# Patient Record
Sex: FEMALE | Race: WHITE | Employment: FULL TIME | URBAN - METROPOLITAN AREA
[De-identification: names, ages, dates, MRNs, and addresses within clinical notes are randomized per-mention and may not be internally consistent; named-entity substitution may affect disease eponyms.]

---

## 2017-01-04 PROBLEM — N81.10 FEMALE CYSTOCELE: Status: ACTIVE | Noted: 2017-01-04

## 2017-01-04 PROCEDURE — 88175 CYTOPATH C/V AUTO FLUID REDO: CPT | Performed by: PHYSICIAN ASSISTANT

## 2017-07-24 PROCEDURE — 87086 URINE CULTURE/COLONY COUNT: CPT | Performed by: EMERGENCY MEDICINE

## 2017-07-24 PROCEDURE — 87077 CULTURE AEROBIC IDENTIFY: CPT | Performed by: EMERGENCY MEDICINE

## 2017-07-24 PROCEDURE — 87186 SC STD MICRODIL/AGAR DIL: CPT | Performed by: EMERGENCY MEDICINE

## 2018-04-24 PROCEDURE — 87086 URINE CULTURE/COLONY COUNT: CPT | Performed by: PHYSICIAN ASSISTANT

## 2019-03-05 PROBLEM — Z99.89 OSA ON CPAP: Status: ACTIVE | Noted: 2019-03-05

## 2019-03-05 PROBLEM — G47.33 OSA ON CPAP: Status: ACTIVE | Noted: 2019-03-05

## 2022-02-23 PROBLEM — N81.4 UTERINE PROLAPSE: Status: ACTIVE | Noted: 2022-02-23

## 2023-10-02 RX ORDER — METHENAMINE HIPPURATE 1000 MG/1
1 TABLET ORAL 2 TIMES DAILY
COMMUNITY

## 2023-10-03 ENCOUNTER — ANESTHESIA EVENT (OUTPATIENT)
Dept: SURGERY | Facility: HOSPITAL | Age: 63
End: 2023-10-03
Payer: COMMERCIAL

## 2023-10-04 ENCOUNTER — HOSPITAL ENCOUNTER (OUTPATIENT)
Facility: HOSPITAL | Age: 63
Setting detail: HOSPITAL OUTPATIENT SURGERY
Discharge: HOME | End: 2023-10-04
Attending: STUDENT IN AN ORGANIZED HEALTH CARE EDUCATION/TRAINING PROGRAM | Admitting: STUDENT IN AN ORGANIZED HEALTH CARE EDUCATION/TRAINING PROGRAM
Payer: COMMERCIAL

## 2023-10-04 ENCOUNTER — ANESTHESIA (OUTPATIENT)
Dept: SURGERY | Facility: HOSPITAL | Age: 63
End: 2023-10-04
Payer: COMMERCIAL

## 2023-10-04 VITALS
TEMPERATURE: 98 F | RESPIRATION RATE: 14 BRPM | OXYGEN SATURATION: 98 % | DIASTOLIC BLOOD PRESSURE: 76 MMHG | WEIGHT: 195 LBS | HEART RATE: 71 BPM | HEIGHT: 68 IN | SYSTOLIC BLOOD PRESSURE: 139 MMHG | BODY MASS INDEX: 29.55 KG/M2

## 2023-10-04 PROCEDURE — 0USG4ZZ REPOSITION VAGINA, PERCUTANEOUS ENDOSCOPIC APPROACH: ICD-10-PCS | Performed by: STUDENT IN AN ORGANIZED HEALTH CARE EDUCATION/TRAINING PROGRAM

## 2023-10-04 PROCEDURE — 88307 TISSUE EXAM BY PATHOLOGIST: CPT | Performed by: STUDENT IN AN ORGANIZED HEALTH CARE EDUCATION/TRAINING PROGRAM

## 2023-10-04 PROCEDURE — 0UT94ZL RESECTION OF UTERUS, SUPRACERVICAL, PERCUTANEOUS ENDOSCOPIC APPROACH: ICD-10-PCS | Performed by: STUDENT IN AN ORGANIZED HEALTH CARE EDUCATION/TRAINING PROGRAM

## 2023-10-04 PROCEDURE — 0UT74ZZ RESECTION OF BILATERAL FALLOPIAN TUBES, PERCUTANEOUS ENDOSCOPIC APPROACH: ICD-10-PCS | Performed by: STUDENT IN AN ORGANIZED HEALTH CARE EDUCATION/TRAINING PROGRAM

## 2023-10-04 PROCEDURE — 8E0W4CZ ROBOTIC ASSISTED PROCEDURE OF TRUNK REGION, PERCUTANEOUS ENDOSCOPIC APPROACH: ICD-10-PCS | Performed by: STUDENT IN AN ORGANIZED HEALTH CARE EDUCATION/TRAINING PROGRAM

## 2023-10-04 DEVICE — TRADITIONAL Y MESH
Type: IMPLANTABLE DEVICE | Site: PELVIS | Status: FUNCTIONAL
Brand: UPSYLON™

## 2023-10-04 RX ORDER — ASCORBIC ACID 500 MG
500 TABLET ORAL DAILY
COMMUNITY

## 2023-10-04 RX ORDER — BUPIVACAINE HYDROCHLORIDE 2.5 MG/ML
INJECTION, SOLUTION EPIDURAL; INFILTRATION; INTRACAUDAL AS NEEDED
Status: DISCONTINUED | OUTPATIENT
Start: 2023-10-04 | End: 2023-10-04 | Stop reason: HOSPADM

## 2023-10-04 RX ORDER — NALOXONE HYDROCHLORIDE 0.4 MG/ML
80 INJECTION, SOLUTION INTRAMUSCULAR; INTRAVENOUS; SUBCUTANEOUS AS NEEDED
Status: DISCONTINUED | OUTPATIENT
Start: 2023-10-04 | End: 2023-10-04

## 2023-10-04 RX ORDER — HYDROMORPHONE HYDROCHLORIDE 1 MG/ML
0.6 INJECTION, SOLUTION INTRAMUSCULAR; INTRAVENOUS; SUBCUTANEOUS EVERY 5 MIN PRN
Status: DISCONTINUED | OUTPATIENT
Start: 2023-10-04 | End: 2023-10-04

## 2023-10-04 RX ORDER — DEXAMETHASONE SODIUM PHOSPHATE 4 MG/ML
VIAL (ML) INJECTION AS NEEDED
Status: DISCONTINUED | OUTPATIENT
Start: 2023-10-04 | End: 2023-10-04 | Stop reason: SURG

## 2023-10-04 RX ORDER — ACETAMINOPHEN 500 MG
1000 TABLET ORAL ONCE
Status: COMPLETED | OUTPATIENT
Start: 2023-10-04 | End: 2023-10-04

## 2023-10-04 RX ORDER — DEXMEDETOMIDINE HYDROCHLORIDE 4 UG/ML
INJECTION, SOLUTION INTRAVENOUS CONTINUOUS PRN
Status: DISCONTINUED | OUTPATIENT
Start: 2023-10-04 | End: 2023-10-04 | Stop reason: SURG

## 2023-10-04 RX ORDER — SODIUM CHLORIDE, SODIUM LACTATE, POTASSIUM CHLORIDE, CALCIUM CHLORIDE 600; 310; 30; 20 MG/100ML; MG/100ML; MG/100ML; MG/100ML
INJECTION, SOLUTION INTRAVENOUS CONTINUOUS
Status: DISCONTINUED | OUTPATIENT
Start: 2023-10-04 | End: 2023-10-04

## 2023-10-04 RX ORDER — HYDROMORPHONE HYDROCHLORIDE 1 MG/ML
0.4 INJECTION, SOLUTION INTRAMUSCULAR; INTRAVENOUS; SUBCUTANEOUS EVERY 5 MIN PRN
Status: DISCONTINUED | OUTPATIENT
Start: 2023-10-04 | End: 2023-10-04

## 2023-10-04 RX ORDER — ONDANSETRON 2 MG/ML
4 INJECTION INTRAMUSCULAR; INTRAVENOUS EVERY 6 HOURS PRN
Status: DISCONTINUED | OUTPATIENT
Start: 2023-10-04 | End: 2023-10-04

## 2023-10-04 RX ORDER — MORPHINE SULFATE 4 MG/ML
4 INJECTION, SOLUTION INTRAMUSCULAR; INTRAVENOUS EVERY 10 MIN PRN
Status: DISCONTINUED | OUTPATIENT
Start: 2023-10-04 | End: 2023-10-04

## 2023-10-04 RX ORDER — SODIUM CHLORIDE 9 MG/ML
INJECTION, SOLUTION INTRAVENOUS CONTINUOUS PRN
Status: DISCONTINUED | OUTPATIENT
Start: 2023-10-04 | End: 2023-10-04 | Stop reason: SURG

## 2023-10-04 RX ORDER — PHENAZOPYRIDINE HYDROCHLORIDE 100 MG/1
100 TABLET, FILM COATED ORAL ONCE
Status: COMPLETED | OUTPATIENT
Start: 2023-10-04 | End: 2023-10-04

## 2023-10-04 RX ORDER — HYDROMORPHONE HYDROCHLORIDE 1 MG/ML
0.2 INJECTION, SOLUTION INTRAMUSCULAR; INTRAVENOUS; SUBCUTANEOUS EVERY 5 MIN PRN
Status: DISCONTINUED | OUTPATIENT
Start: 2023-10-04 | End: 2023-10-04

## 2023-10-04 RX ORDER — MORPHINE SULFATE 4 MG/ML
2 INJECTION, SOLUTION INTRAMUSCULAR; INTRAVENOUS EVERY 10 MIN PRN
Status: DISCONTINUED | OUTPATIENT
Start: 2023-10-04 | End: 2023-10-04

## 2023-10-04 RX ORDER — ONDANSETRON 2 MG/ML
INJECTION INTRAMUSCULAR; INTRAVENOUS AS NEEDED
Status: DISCONTINUED | OUTPATIENT
Start: 2023-10-04 | End: 2023-10-04 | Stop reason: SURG

## 2023-10-04 RX ORDER — DIPHENHYDRAMINE HYDROCHLORIDE 50 MG/ML
INJECTION INTRAMUSCULAR; INTRAVENOUS AS NEEDED
Status: DISCONTINUED | OUTPATIENT
Start: 2023-10-04 | End: 2023-10-04 | Stop reason: SURG

## 2023-10-04 RX ORDER — HEPARIN SODIUM 5000 [USP'U]/ML
5000 INJECTION, SOLUTION INTRAVENOUS; SUBCUTANEOUS ONCE
Status: COMPLETED | OUTPATIENT
Start: 2023-10-04 | End: 2023-10-04

## 2023-10-04 RX ORDER — SCOLOPAMINE TRANSDERMAL SYSTEM 1 MG/1
1 PATCH, EXTENDED RELEASE TRANSDERMAL
Status: DISCONTINUED | OUTPATIENT
Start: 2023-10-04 | End: 2023-10-04 | Stop reason: HOSPADM

## 2023-10-04 RX ORDER — PROCHLORPERAZINE EDISYLATE 5 MG/ML
5 INJECTION INTRAMUSCULAR; INTRAVENOUS EVERY 8 HOURS PRN
Status: DISCONTINUED | OUTPATIENT
Start: 2023-10-04 | End: 2023-10-04

## 2023-10-04 RX ORDER — CEFAZOLIN SODIUM/WATER 2 G/20 ML
2 SYRINGE (ML) INTRAVENOUS ONCE
Status: COMPLETED | OUTPATIENT
Start: 2023-10-04 | End: 2023-10-04

## 2023-10-04 RX ORDER — LIDOCAINE HYDROCHLORIDE 10 MG/ML
INJECTION, SOLUTION EPIDURAL; INFILTRATION; INTRACAUDAL; PERINEURAL AS NEEDED
Status: DISCONTINUED | OUTPATIENT
Start: 2023-10-04 | End: 2023-10-04 | Stop reason: SURG

## 2023-10-04 RX ORDER — KETOROLAC TROMETHAMINE 10 MG/1
10 TABLET, FILM COATED ORAL EVERY 6 HOURS PRN
Qty: 20 TABLET | Refills: 0 | Status: SHIPPED | OUTPATIENT
Start: 2023-10-04 | End: 2023-10-09

## 2023-10-04 RX ORDER — POLYETHYLENE GLYCOL 3350 17 G/17G
17 POWDER, FOR SOLUTION ORAL DAILY PRN
Qty: 72 EACH | Refills: 0 | Status: SHIPPED | OUTPATIENT
Start: 2023-10-04 | End: 2023-11-03

## 2023-10-04 RX ORDER — MORPHINE SULFATE 10 MG/ML
6 INJECTION, SOLUTION INTRAMUSCULAR; INTRAVENOUS EVERY 10 MIN PRN
Status: DISCONTINUED | OUTPATIENT
Start: 2023-10-04 | End: 2023-10-04

## 2023-10-04 RX ORDER — ROCURONIUM BROMIDE 10 MG/ML
INJECTION, SOLUTION INTRAVENOUS AS NEEDED
Status: DISCONTINUED | OUTPATIENT
Start: 2023-10-04 | End: 2023-10-04 | Stop reason: SURG

## 2023-10-04 RX ORDER — MAGNESIUM SULFATE HEPTAHYDRATE 500 MG/ML
INJECTION, SOLUTION INTRAMUSCULAR; INTRAVENOUS AS NEEDED
Status: DISCONTINUED | OUTPATIENT
Start: 2023-10-04 | End: 2023-10-04 | Stop reason: SURG

## 2023-10-04 RX ADMIN — ONDANSETRON 4 MG: 2 INJECTION INTRAMUSCULAR; INTRAVENOUS at 09:40:00

## 2023-10-04 RX ADMIN — MAGNESIUM SULFATE HEPTAHYDRATE 2 G: 500 INJECTION, SOLUTION INTRAMUSCULAR; INTRAVENOUS at 09:08:00

## 2023-10-04 RX ADMIN — ROCURONIUM BROMIDE 10 MG: 10 INJECTION, SOLUTION INTRAVENOUS at 10:12:00

## 2023-10-04 RX ADMIN — DEXMEDETOMIDINE HYDROCHLORIDE 0.5 MCG/KG/HR: 4 INJECTION, SOLUTION INTRAVENOUS at 08:36:00

## 2023-10-04 RX ADMIN — CEFAZOLIN SODIUM/WATER 2 G: 2 G/20 ML SYRINGE (ML) INTRAVENOUS at 08:00:00

## 2023-10-04 RX ADMIN — ROCURONIUM BROMIDE 10 MG: 10 INJECTION, SOLUTION INTRAVENOUS at 09:19:00

## 2023-10-04 RX ADMIN — DEXMEDETOMIDINE HYDROCHLORIDE 0.7 MCG/KG/HR: 4 INJECTION, SOLUTION INTRAVENOUS at 08:40:00

## 2023-10-04 RX ADMIN — SODIUM CHLORIDE: 9 INJECTION, SOLUTION INTRAVENOUS at 10:44:00

## 2023-10-04 RX ADMIN — SODIUM CHLORIDE, SODIUM LACTATE, POTASSIUM CHLORIDE, CALCIUM CHLORIDE: 600; 310; 30; 20 INJECTION, SOLUTION INTRAVENOUS at 10:45:00

## 2023-10-04 RX ADMIN — SODIUM CHLORIDE: 9 INJECTION, SOLUTION INTRAVENOUS at 09:22:00

## 2023-10-04 RX ADMIN — SODIUM CHLORIDE: 9 INJECTION, SOLUTION INTRAVENOUS at 07:38:00

## 2023-10-04 RX ADMIN — SODIUM CHLORIDE, SODIUM LACTATE, POTASSIUM CHLORIDE, CALCIUM CHLORIDE: 600; 310; 30; 20 INJECTION, SOLUTION INTRAVENOUS at 09:22:00

## 2023-10-04 RX ADMIN — ROCURONIUM BROMIDE 50 MG: 10 INJECTION, SOLUTION INTRAVENOUS at 07:39:00

## 2023-10-04 RX ADMIN — DEXAMETHASONE SODIUM PHOSPHATE 8 MG: 4 MG/ML VIAL (ML) INJECTION at 07:36:00

## 2023-10-04 RX ADMIN — DIPHENHYDRAMINE HYDROCHLORIDE 12.5 MG: 50 INJECTION INTRAMUSCULAR; INTRAVENOUS at 09:39:00

## 2023-10-04 RX ADMIN — LIDOCAINE HYDROCHLORIDE 50 MG: 10 INJECTION, SOLUTION EPIDURAL; INFILTRATION; INTRACAUDAL; PERINEURAL at 07:36:00

## 2023-10-04 NOTE — INTERVAL H&P NOTE
Pre-op Diagnosis: Incomplete uterovaginal collapse    The above referenced H&P was reviewed by Radha Crocker DO on 10/4/2023, the patient was examined and no significant changes have occurred in the patient's condition since the H&P was performed. I discussed with the patient and/or legal representative the potential benefits, risks and side effects of this procedure; the likelihood of the patient achieving goals; and potential problems that might occur during recuperation. I discussed reasonable alternatives to the procedure, including risks, benefits and side effects related to the alternatives and risks related to not receiving this procedure. We will proceed with procedure as planned.

## 2023-10-04 NOTE — ANESTHESIA PROCEDURE NOTES
Airway  Date/Time: 10/4/2023 7:37 AM  Urgency: Elective    Airway not difficult    General Information and Staff    Patient location during procedure: OR  Resident/CRNA: Addi Hunter CRNA  Performed: CRNA   Performed by: Addi Hunter CRNA  Authorized by: Nicholas Gómez MD      Indications and Patient Condition  Indications for airway management: anesthesia  Spontaneous Ventilation: absent  Sedation level: deep  Preoxygenated: yes  Patient position: sniffing  MILS maintained throughout  Mask difficulty assessment: 1 - vent by mask    Final Airway Details  Final airway type: endotracheal airway      Successful airway: ETT  Cuffed: yes   Successful intubation technique: direct laryngoscopy  Facilitating devices/methods: intubating stylet  Endotracheal tube insertion site: oral  Blade: Christopher  Blade size: #3  ETT size (mm): 7.0    Cormack-Lehane Classification: grade I - full view of glottis  Placement verified by: capnometry   Measured from: teeth  ETT to teeth (cm): 21  Number of attempts at approach: 1  Number of other approaches attempted: 0

## 2023-10-04 NOTE — H&P
64yo F with prolapse referred by Audra. Has #5 ring w support in currently. Still has uterus. No PMB. Did have ASCUS on last PAP with rec to repeat PAP in 1 year. Still would like to be sexually active. No CHICHO with pessary in or out. H/o tubal ligation           History/Other:          Current Outpatient Medications   Medication Sig Dispense Refill    fluconazole (DIFLUCAN) 150 MG Oral Tab Take 1 tablet (150 mg total) by mouth once for 1 dose. 1 tablet 0    estradiol (ESTRACE) 0.1 MG/GM Vaginal Cream Place 1 g vaginally daily. Place 1 gram vaginally nightly for 2 weeks, then 3 times a week after. 1 each 3    methenamine 1 g Oral Tab Take 1 tablet (1,000 mg total) by mouth daily. Take with vitamin C 90 tablet 3    Ergocalciferol (VITAMIN D OR) Take by mouth daily. Glucosamine Sulfate (JAKUB OR) Take by mouth daily.         Multiple Vitamin (MULTIVITAMIN OR) None Entered               Past Medical History:   Diagnosis Date    PONV (postoperative nausea and vomiting)                Past Surgical History:   Procedure Laterality Date    AUDITORY EVOKED POTENTIAL Right 04/13/2016     Procedure: STAPEDECTOMY;  Surgeon: Guanakito Gray MD;  Location: [address] [place] [address]    COLONOSCOPY   3/8/12= Normal     Repeat 2022    COLONOSCOPY   6/27/22= Diverticulosis, Polyps (TA, Serrated)     Repeat 2027    COLONOSCOPY N/A 06/27/2022     Procedure: COLONOSCOPY w/ polypectomy ;  Surgeon: Matthew Lucio MD;  Location: [facility]   03/08/2012     Procedure: ESOPHAGOGASTRODUODENOSCOPY, COLONOSCOPY, POSSIBLE BIOPSY, POSSIBLE POLYPECTOMY [id number];  Surgeon: Matthew Lucio MD;  Location: [address] [place] [address]    NEEDLE BIOPSY LEFT   age 25     clogged milk duct- no visible scar    OTHER SURGICAL HISTORY   03/15/2023     Cysto-Dr. Corrinne Shoe    [facility] TISSUE FOR GRAFT OTHR Right 04/13/2016     Procedure: STAPEDECTOMY;  Surgeon: Guanakito Gray MD;  Location: [address] [place] [address] STAPEDECTOMY Right 04/13/2016     Procedure: STAPEDECTOMY;  Surgeon: Mickey Silva MD;  Location: [address] [place] [address]    TUBAL LIGATION        UP GI ENDOSCOPY,SONIA W GUIDE   03/08/2012     Procedure: ESOPHAGOGASTRODUODENOSCOPY, COLONOSCOPY, POSSIBLE BIOPSY, POSSIBLE POLYPECTOMY [id number];  Surgeon: Tsering Parisi MD;  Location: [address] [place] [address]    UPPER GI ENDOSCOPY,EXAM                    Family History   Problem Relation Age of Onset    Breast Cancer Maternal Grandmother 76         age 66's    Cancer Maternal Grandmother           postmeno br ca    Hypertension Father      Skin cancer Father      Lipids Mother      Heart Disease Mother      Pacemaker Mother      Diabetes Mother      Other (Other) Mother           torsades de [place]         REVIEW OF SYSTEMS:     A 10-point comprehensive review of systems was negative with pertinent items noted in HPI.             Objective:   GENERAL: well developed, well nourished, in no apparent distress  HEENT: atraumatic, normocephalic  LUNGS: normal respiratory motion without distress  CARDIO:NA  Abd: Soft, non-tender, non-distended  : ++vaginal discharge  Ba +2 C 0  Bp 0 gh 5.5 TVL 8 D -1  Pelvic Floor TTP: none  CHICHO: none  Masses Appreciated: no  NEURO: Alert   EXTREMITIES: Edema no     Cystometrics: no                 Lab Results   Component Value Date     GLUCOSEDIP Negative 05/02/2023     BILIRUBIN Negative 05/02/2023     KETONESDIP Negative 05/02/2023     SPECGRAVITY 1.010 05/02/2023     BLOODU Negative 05/02/2023     PHURINE 7.0 05/02/2023     PROTEINDIP Negative 05/02/2023     UROBILIN 0.2 05/02/2023     NITRITE Negative 05/02/2023     LEUKOCYTES Negative 05/02/2023            PVR: 41ml           Assessment & Plan:   64yo F  Diagnoses and all orders for this visit:     Female cystocele  -     URINALYSIS, AUTO, W/O SCOPE  -     MEASUREMENT, POST-VOIDING RESIDUAL URINE &/OR BLADDER CAPACITY, US, NON-IMAGING     Incomplete uterovaginal prolapse Rectocele     Other orders  -     fluconazole (DIFLUCAN) 150 MG Oral Tab; Take 1 tablet (150 mg total) by mouth once for 1 dose. Plan:  -#5 ring w support removed today and cleaned     Assessment/Plan:  1. Pelvic organ prolapse  Discussed the natural history of pelvic organ prolapse. Prolapse is unlikely to self-resolve, although may not necessarily worsen with time with care in avoidance of abdominal straining, heavy lifting, and weight gain. All the treatment options were reviewed including      1) Watchful waiting particularly if symptoms are non-bothersome. Should have a good bowel regimen, avoiding constipation and straining. Avoiding heavy weightlifting if possible. Exhaling with lifting to decrease transmission of force to pelvis. 2) Kegel exercise/strenthing - would not anatomically correct the prolapse but may decrease the sensation of a vaginal bulge. 3) pessary placement- would require removal and cleaning regularly (not necessarily daily) that could be performed by the patient with proper instruction or managed by our physician assistant. 4) Surgical repair - which would be an outpatient procedure and require pelvic rest + lifting restrictions for at least 6 weeks.      -Surgical options including vaginal, robotic and open abdominal operations were discussed at length as well as the risks and benefits incurred by treatment option.     -A concurrent mid-urethral sling procedure was also discussed for the treatment of CHICHO/prevention of de portillo CHICHO after prolapse repair. All relevant recent data discussed with patient as well as risks associate with mesh. Patient has abnormal PAP (ASCUS) so would need total hys. Discussed Robotic total Hys, BS, sacrocolpopexy, posterior repair vs TVH, BS, Anterior/posterior repair as best options. Vaginal approach has higher risk of recurrence but robotic approach has  ahigher risk of mesh erosion with taking the cervix.  Patient would like to think about it     Either case I would do with Dr. Sherwin Russo. Message sent to her and she will see her while she make her decision    Addendum:  PAP neg for HPV and this w ASCUS considered normal. Discussed with Dr. Wes Azar and [facility]. Patient has elected to undergo [facility], [place] [address], BS, posterior repair     45 minutes spent with chart review, result interpretation, exam and patient discussion/education.

## 2023-10-04 NOTE — OPERATIVE REPORT
[facility]    Operative Note         Court Cash Location: OR   CSN [id number] MRN [id number]   Admission Date 10/4/2023 Operation Date 10/4/2023   Attending Physician Ashley Haynes DO       Patient Name: Court Cash     Preoperative Diagnosis: Incomplete uterovaginal collapse, cystocele      Postoperative Diagnosis: Incomplete uterovaginal collapse, cystocele     Procedure(s):  Xi robotic-assisted sacrocolpopexy, supracervical hysterectomy uterus <250g, bilateral salpingectomy, perrineorrhaphy, cystoscopy      Primary Surgeon: Baltazar Worthington DO     Surgical Assistant.: [employer], CSA     Anesthesia: General     Specimen:   ID Type Source Tests Collected by Time Destination   1 : 1. Uterus and bilateral tubes Tissue Uterus, fallopian tube(s) SURGICAL PATHOLOGY TISSUE Ashley Haynes DO 10/4/2023 0902         Estimated Blood Loss: 38MI    Complications: none      Surgical Findings:   -Uterus <250g  -Complete reduction of prolapse with RASC. No need for posterior repair     Operative Summary:      64yo female with bothersome stage IV uterovaginal prolapse. She was counseled on management options for the prolapse including abdominal and vaginal approaches with or without uterine sparing. Given no CHICHO on her preop exam, midurethral sling was also not recommended. She elected to proceed in fully informed fashion after discussion of procedures, alternatives, benefits, and risks. An 16-Fr catheter was placed in the bladder. We began gaining access to the abdomen with a Veress needle in the midline. The abdomen was insufflated and an 8mm port was then introduced. The abdominal cavity was inspected with a 30-degree camera. Two additional 8 mm robotic ports were placed on the patient's left side. An additional robotic port was placed on the patient's right side and an 8 mm assistant airseal port. The skin was infiltrated with Marcaine prior to an incision at each port site.  Each port was then visually watched in. Port sites were at least 8-9 cm away from each other in a straight across configuration. Airseal was used for the case. Once all ports were in place the robot was docked from the side. Once the robotic arms were docked, monopolar amy, bipolar graspers and a prograsp were placed in the arms. The peritoneum over the sacral promontory was incised. The anterior longitudinal ligament was cleaned off, a trough was created down the peritoneum and opened towards the apex of the vagina. The right ureter was visible throughout dissection and unharmed. Attention was then turned to the peritoneum at the bladder reflection which was tented up and incised with Monopolar current to create the bladder flap which was advanced to peel the bladder away from the cervix. We proceeded to just above the site of the leyva balloon. Peritoneum was dissected off post vag wall in similar fashion. We then started the supracervical hysterectomy on the right side. Both ovaries were inspected and appeared otherwise normal. The right uteroovarian ligament and mesosalpinx were cauterized to release the right fallopian tube. Bipolar energy was applied along the broad ligament to cauterize and incise down to the junction with the cervix. Care was taken to cauterize all uterine arteries encountered. This process was repeated on the contralateral side without difficulty, cauterizing the left uteroovarian ligament and mesosalpinx to release the left fallopian tube. Then the broad ligament was cauterized as above. The body of the uterus was then amputated from the cervix with monopolar scissors. The uterus was placed in the right paracolic gutter. We then continued developing the anterior and posterior leafs of peritoneum from the vaginal wall. Once these areas were developed, Y-shaped mesh was delivered into the abdomen. Using 2-0 PDS, the Y mesh was affixed to the anterior vaginal wall and apex.  The posterior arm of the mesh was fixated to the posterior vaginal wall and apex. Excess mesh was cut and removed. Once these were secured we laid the mesh on our previously developed peritoneal trough to the anterior longitudinal ligament. The EEA sizer was placed in the vagina to determine the mesh fixation point and a vaginal exam confirmed adequate prolapse reduction without hypersuspension. Once that was evaluated, we then placed 0-0 Prolene to fix the mesh to the sacral promontory. The mesh was sutured at two places to the sacral promontory. I used flow seal over the sacral promontory to ensure excellent hemostasis. There was no active bleeding. We then retroperitonealized the mesh using an 0-0 V-lock absorbable suture in a running fashion. No bleeding was seen on inspection. Cystoscopy confirmed no sutures in the bladder and bilateral ureteral efflux. The uterus and fallopian tubes were placed in a specimen bag. The ports were then withdrawn under direct visualization. Pneumoperitoneum was let down and then the robot undocked. The uterus and fallopian tubes were removed from the supraumbilical incision by extending the incision slightly. The supraumbilical fascial incision was then closed with Vicryl 0-0 sutures in a running fashion to adequately close the fascia. All wounds were irrigated copiously, the skin at all ports was closed using 4-0 monocryl subcuticular fashion. Dermabond was applied over them. Next, attention was then turned to the perineorrhaphy as patient had excellent reduction of her prolapse after the RASC and posterior repair was not needed. Two Allis clamps were placed along the hymenal ring at the posterior aspect. Lidocaine with epi was injected below the vaginal epithelium in the area of the rectocele and widened hiatus. A vinayak of perineal tissue was removed with a knofe after injecting with lidocaine and epinephrine. The perineal body was rebuilt with 0-0 Vicryl.  Next, the vaginal and perineal incision was closed using running locked suture of 2-0 Vicryl. The vaginal vault was hemostatic at case conclusion. Vaginal sweep negative. All sponge, instrument, and needle counts were reported as correct. The leyva catheter was left in place and will be removed in PACU for a voiding trial.     The patient was cleaned, dried, and repositioned supine. She was extubated and transported to the recovery room for further postoperative monitoring. Implants:   Implant Name Type Inv.  Item Serial No.  Lot No. LRB No. Used Action   MESH PELV FLR COLIN Y L PORE LTWT LO SURF AREA - SN/A  MESH PELV FLR COLIN Y L PORE LTWT LO SURF AREA N/A [employer] [id number] N/A 1 Implanted   POWDER HEMSTAT 3GM OXIDIZED REGENERATED CELOS - SN/A  POWDER HEMSTAT 3GM OXIDIZED REGENERATED [id number] [id number] N/A 1 Implanted        Drains: 16F leyva catheter to PACU     Condition: stable       Priscilla Simon DO

## (undated) DEVICE — STERILE H2O FOR IRRIG .

## (undated) DEVICE — ROBOTIC: Brand: MEDLINE INDUSTRIES, INC.

## (undated) DEVICE — SUTURE PDS II SZ 2-0 L27IN ABSRB VLT L26MM

## (undated) DEVICE — TENACULUM FORCEPS: Brand: ENDOWRIST

## (undated) DEVICE — INSUFFLATION NEEDLE TO ESTABLISH PNEUMOPERITONEUM.: Brand: INSUFFLATION NEEDLE

## (undated) DEVICE — AIRSEAL 8 MM ACCESS PORT AND LOW PROFILE OBTURATOR WITH BLADELESS OPTICAL TIP, 120 MM LENGTH: Brand: AIRSEAL

## (undated) DEVICE — 3M™ IOBAN™ 2 ANTIMICROBIAL INCISE DRAPE 6640EZ: Brand: IOBAN™ 2

## (undated) DEVICE — LAPAROVUE VISIBILITY SYSTEM LAPAROSCOPIC SOLUTIONS: Brand: LAPAROVUE

## (undated) DEVICE — LARGE NEEDLE DRIVER: Brand: ENDOWRIST

## (undated) DEVICE — DISPOSABLE SUCTION/IRRIGATOR TUBE SET: Brand: AHTO

## (undated) DEVICE — POWDER HEMSTAT 3GM OXIDIZED REGENERATED CELOS

## (undated) DEVICE — APPLICATOR ENDOSCP FOR ADJUNCTIVE HEMSTAS

## (undated) DEVICE — ADHESIVE SKIN TOP FOR WND CLSR DERMBND ADV

## (undated) DEVICE — GAMMEX® PI HYBRID SIZE 6.5, STERILE POWDER-FREE SURGICAL GLOVE, POLYISOPRENE AND NEOPRENE BLEND: Brand: GAMMEX

## (undated) DEVICE — BLADELESS OBTURATOR: Brand: WECK VISTA

## (undated) DEVICE — SUTURE VCRL SZ 0 L27IN ABSRB VLT L36MM CT-1

## (undated) DEVICE — SUCTION CANISTER, 3000CC,SAFELINER: Brand: DEROYAL

## (undated) DEVICE — AIRSEAL TRI-LUMEN LILTERED TUBE SET: Brand: AIRSEAL

## (undated) DEVICE — VIOLET BRAIDED (POLYGLACTIN 910), SYNTHETIC ABSORBABLE SUTURE: Brand: COATED VICRYL

## (undated) DEVICE — DRAPE,UNDRBUT,WHT GRAD PCH,CAPPORT,20/CS: Brand: MEDLINE

## (undated) DEVICE — SUTURE MCRYL SZ 4-0 L18IN ABSRB UD L19MM PS-2

## (undated) DEVICE — TRAY CATH 16FR F INCLUDE BARDX IC COMPLT CARE

## (undated) DEVICE — GAMMEX® PI HYBRID SIZE 7, STERILE POWDER-FREE SURGICAL GLOVE, POLYISOPRENE AND NEOPRENE BLEND: Brand: GAMMEX

## (undated) DEVICE — ELECTRODE ES RET 2 PATE W/ 10FT CRD MPLR DISP

## (undated) DEVICE — STERILE SURGICAL LUBRICANT, METAL TUBE: Brand: SURGILUBE

## (undated) DEVICE — NDLCTR: FOAM/ADHESIVE 10CT 96/CS: Brand: MEDICAL ACTION INDUSTRIES

## (undated) DEVICE — HOOK RETRCT L5MM E SHRP SELF RET SYS LONE

## (undated) DEVICE — ARM DRAPE

## (undated) DEVICE — ABSORBABLE WOUND CLOSURE DEVICE: Brand: V-LOC 180

## (undated) DEVICE — GAMMEX® PI HYBRID SIZE 6, STERILE POWDER-FREE SURGICAL GLOVE, POLYISOPRENE AND NEOPRENE BLEND: Brand: GAMMEX

## (undated) DEVICE — SUTURE VCRL SZ 3-0 L27IN ABSRB UD L26MM SH

## (undated) DEVICE — PAD POS 36IN DISP SURGYPAD

## (undated) DEVICE — TRAY SKIN PREP PVP-1

## (undated) DEVICE — SOLUTION IRRIG 1000ML 0.9% NACL USP BTL

## (undated) DEVICE — TIP COVER ACCESSORY

## (undated) DEVICE — SUTURE PROL SZ 0 L30IN NONABSORBABLE BLU

## (undated) DEVICE — COLUMN DRAPE

## (undated) DEVICE — BIPOLAR GRASPER, LONG: Brand: ENDOWRIST

## (undated) DEVICE — ANCHOR TISSUE RETRIEVAL SYSTEM, BAG SIZE 125 ML, PORT SIZE 8 MM: Brand: ANCHOR TISSUE RETRIEVAL SYSTEM

## (undated) DEVICE — INTENDED FOR TISSUE SEPARATION, AND OTHER PROCEDURES THAT REQUIRE A SHARP SURGICAL BLADE TO PUNCTURE OR CUT.: Brand: BARD-PARKER ® STAINLESS STEEL BLADES

## (undated) DEVICE — MEGA SUTURECUT ND: Brand: ENDOWRIST

## (undated) DEVICE — FLEXIBLE YANKAUER,MEDIUM TIP, NO VACUUM CONTROL: Brand: ARGYLE

## (undated) DEVICE — SOLUTION IRRIG 3000ML 0.9% NACL FLX CONT

## (undated) DEVICE — SLEEVE COMPR M KNEE LEN SGL USE KENDALL SCD

## (undated) DEVICE — CANNULA SEAL

## (undated) DEVICE — TENACULUM FORCEPS: Brand: ENDOWRIST;DAVINCI SI

## (undated) DEVICE — MONOPOLAR CURVED SCISSORS: Brand: ENDOWRIST

## (undated) DEVICE — DRAPE,ROBOTICS,STERILE: Brand: MEDLINE